# Patient Record
Sex: MALE | Race: WHITE
[De-identification: names, ages, dates, MRNs, and addresses within clinical notes are randomized per-mention and may not be internally consistent; named-entity substitution may affect disease eponyms.]

---

## 2020-11-02 ENCOUNTER — HOSPITAL ENCOUNTER (OUTPATIENT)
Dept: HOSPITAL 41 - JD.SDS | Age: 60
Discharge: HOME | End: 2020-11-02
Attending: ORTHOPAEDIC SURGERY
Payer: COMMERCIAL

## 2020-11-02 VITALS — SYSTOLIC BLOOD PRESSURE: 112 MMHG | HEART RATE: 51 BPM | DIASTOLIC BLOOD PRESSURE: 76 MMHG

## 2020-11-02 DIAGNOSIS — Z79.899: ICD-10-CM

## 2020-11-02 DIAGNOSIS — G89.18: ICD-10-CM

## 2020-11-02 DIAGNOSIS — M17.11: Primary | ICD-10-CM

## 2020-11-02 DIAGNOSIS — Z98.890: ICD-10-CM

## 2020-11-02 DIAGNOSIS — G47.33: ICD-10-CM

## 2020-11-02 PROCEDURE — 97165 OT EVAL LOW COMPLEX 30 MIN: CPT

## 2020-11-02 PROCEDURE — 97161 PT EVAL LOW COMPLEX 20 MIN: CPT

## 2020-11-02 PROCEDURE — 27447 TOTAL KNEE ARTHROPLASTY: CPT

## 2020-11-02 PROCEDURE — 73560 X-RAY EXAM OF KNEE 1 OR 2: CPT

## 2020-11-02 PROCEDURE — 97110 THERAPEUTIC EXERCISES: CPT

## 2020-11-02 PROCEDURE — 97116 GAIT TRAINING THERAPY: CPT

## 2020-11-02 PROCEDURE — C1776 JOINT DEVICE (IMPLANTABLE): HCPCS

## 2020-11-02 PROCEDURE — 87641 MR-STAPH DNA AMP PROBE: CPT

## 2020-11-02 RX ADMIN — DEXTROSE ONE GM: 5 SOLUTION INTRAVENOUS at 08:15

## 2020-11-02 RX ADMIN — DEXTROSE ONE GM: 5 SOLUTION INTRAVENOUS at 08:25

## 2020-11-02 NOTE — PCM.PREANE
Preanesthetic Assessment





- Anesthesia/Transfusion/Family Hx


Anesthesia History: Prior Anesthesia Without Reaction


Family History of Anesthesia Reaction: No


Transfusion History: No Prior Transfusion(s)


Type of Transfusion Reactions: Reports: Unknown





- Review of Systems


General: No Symptoms


Pulmonary: No Symptoms


Cardiovascular: No Symptoms


Gastrointestinal: No Symptoms


Neurological: No Symptoms


Other: Reports: None





- Physical Assessment


NPO Status Date: 11/01/20


NPO Status Time: 18:30


ASA Class: 2


Mental Status: Alert & Oriented x3


Airway Class: Mallampati = 1


Dentition: Reports: Normal Dentition


Thyro-Mental Finger Breadths: 3


Mouth Opening Finger Breadths: 3


ROM/Head Extension: Full


Lungs: Clear to Auscultation, Normal Respiratory Effort


Cardiovascular: Regular Rate, Regular Rhythm





- Lab


Values: 





                             Laboratory Last Values











MRSA (PCR)  Negative   10/21/20  15:05    














- Allergies


Allergies/Adverse Reactions: 


                                    Allergies











Allergy/AdvReac Type Severity Reaction Status Date / Time


 


No Known Allergies Allergy   Verified 11/02/20 06:28














- Acknowledgements


Anesthesia Type Planned: Spinal


Pt an Appropriate Candidate for the Planned Anesthesia: Yes


Alternatives and Risks of Anesthesia Discussed w Pt/Guardian: Yes


Pt/Guardian Understands and Agrees with Anesthesia Plan: Yes





PreAnesthesia Questionnaire





- Past Health History


Medical/Surgical History: Denies Medical/Surgical History


HEENT History: Reports: Impaired Vision


Cardiovascular History: Reports: None


Respiratory History: Reports: Sleep Apnea (CPAP)


Gastrointestinal History: Reports: None


Genitourinary History: Reports: None


OB/GYN History: Reports: None


Musculoskeletal History: Reports: None


Neurological History: Reports: None


Psychiatric History: Reports: None


Endocrine/Metabolic History: Reports: None


Hematologic History: Reports: None


Immunologic History: Reports: None


Oncologic (Cancer) History: Reports: None


Dermatologic History: Reports: None





- Infectious Disease History


Infectious Disease History: Reports: None





- Past Surgical History


Head Surgeries/Procedures: Reports: None


HEENT Surgical History: Reports: None


Cardiovascular Surgical History: Reports: None


Respiratory Surgical History: Reports: None


GI Surgical History: Reports: Colonoscopy


Female  Surgical History: Reports: None


Male  Surgical History: Reports: None


Endocrine Surgical History: Reports: None


Neurological Surgical History: Reports: None


Musculoskeletal Surgical History: Reports: Shoulder Surgery, Other (See Below)


Other Musculoskeletal Surgeries/Procedures:: Right knee surgery in 1981, torn 

meniscus/ACL. Left knee surgery in 1978, torn meniscus. Left hand surgery, hand 

caught in table saw with cut tendons repaired, R SVSA with RCR and SAD


Oncologic Surgical History: Reports: None


Dermatological Surgical History: Reports: None





- SUBSTANCE USE


Tobacco Use Status *Q: Former Tobacco User


Recreational Drug Use History: No





- HOME MEDS


Home Medications: 


                                    Home Meds





Cholecalciferol (Vitamin D3) [Vitamin D3] 1,000 unit PO DAILY 09/09/20 [History]


Cyanocobalamin (Vitamin B12) [Vitamin B12] 1,000 mcg PO DAILY 09/09/20 [History]


Lactobacillus Combo No.10 [Probiotic] 1 cap PO DAILY 09/09/20 [History]


Testosterone Vitamin 1 dose PO DAILY 09/09/20 [History]


Zinc 50 mg PO DAILY 09/09/20 [History]


Fish Oil/Omega-3 Fatty Acids [Fish Oil 1,000 MG] 1 gm PO DAILY 10/30/20 

[History]


Linda 500 mg PO DAILY 10/30/20 [History]


Saw/Vit E/Sod Jossy/Lyc/Beta/Pyg [Prostate Health Caplet] 1 tab PO DAILY 10/30/20 

[History]


Vitamin K2 40 mcg PO DAILY 10/30/20 [History]











- CURRENT (IN HOUSE) MEDS


Current Meds: 





                               Current Medications





Acetaminophen (Tylenol)  975 mg PO NOW FRANSICO


   Stop: 11/02/20 12:00


   Last Admin: 11/02/20 06:27 Dose:  975 mg


   Documented by: 


Lactated Ringer's (Ringers, Lactated)  1,000 mls @ 125 mls/hr IV ASDIRECTED FRANSICO


   Stop: 11/02/20 23:00


   Last Admin: 11/02/20 06:27 Dose:  125 mls/hr


   Documented by: 


Lidocaine/Sodium Bicarbonate (Buffered Lidocaine 1% In Ns 8.4%)  0.25 ml IDERM 

ONETIME PRN


   PRN Reason: Prior to IV Start


   Stop: 11/02/20 23:00


   Last Admin: 11/02/20 06:27 Dose:  0.25 ml


   Documented by: 


Oxycodone HCl (Oxycontin)  10 mg PO ONETIME FRANSICO


   Stop: 11/02/20 12:00


   Last Admin: 11/02/20 06:27 Dose:  10 mg


   Documented by: 


Pregabalin (Lyrica)  50 mg PO ONETIME FRANSICO


   Stop: 11/02/20 12:00


   Last Admin: 11/02/20 06:27 Dose:  50 mg


   Documented by: 


Sodium Chloride (Saline Flush)  10 ml FLUSH ASDIRECTED PRN


   PRN Reason: Keep Vein Open


   Stop: 11/02/20 23:00





Discontinued Medications





Bupivacaine HCl (Sensorcaine-Mpf 0.25%) Confirm Administered Dose 30 ml .ROUTE 

.STK-MED ONE


   Stop: 11/02/20 06:13


Morphine Sulfate 8 mg/Epinephrine HCl 0.3 mg/Cefuroxime Sodium 750 mg/Ketorolac 

Tromethamine 30 mg/Sodium Chloride 7.9 ml  0 mg .XX ASDIRECTED PRN


   PRN Reason: Pain


Tranexamic Acid (Cyklokapron) Confirm Administered Dose 1,000 mg .ROUTE .STK-MED

 ONE


   Stop: 11/02/20 06:13


Vancomycin HCl (Vancomycin) Confirm Administered Dose 1 gm .ROUTE .STK-MED ONE


   Stop: 11/02/20 06:13

## 2020-11-02 NOTE — [id number]
Post Anesthesia Note





- EVALUATION WITHIN 48HRS OF ANESTHETIC


Vital Signs in Normal Range: Yes


Patient Participated in Evaluation: Yes


Respiratory Function Stable: Yes


Airway Patent: Yes


Cardiovascular Function Stable: Yes


Hydration Status Stable: Yes


Pain Control Satisfactory: Yes


Nausea and Vomiting Control Satisfactory: Yes


Mental Status Recovered: Yes


Vital Signs: 


                                Last Vital Signs











Temp  36.8 C   11/02/20 10:50


 


Pulse  70   11/02/20 10:50


 


Resp  12   11/02/20 10:50


 


BP  107/64   11/02/20 10:50


 


Pulse Ox  96   11/02/20 10:50

## 2020-11-02 NOTE — CR
PROCEDURE INFORMATION:

Exam: XR Right Knee

Exam date and time: 11/2/2020 9:06 AM

Age: 60 years old

Clinical indication: Condition or disease; Joint replacement status; Knee; 
Right; Additional info:

Right total knee replacement; Post-op films



TECHNIQUE:

Imaging protocol: XR Right knee.

Views: 1 or 2 views.



COMPARISON:

CR Bone Age Study, Bone Length Scanogram, Knee 3V Rt, Knee Standing 8/19/2020 
1:14 PM



FINDINGS:

Bones/joints: There is a right total knee arthroplasty without evidence of 
hardware complication.

Screws are again present in the patella. Very small residual osteophytes at the 
medial and lateral as

well as posterior articular margins of the tibia and at the lateral femoral 
condyle. No acute fracture.

Normal alignment. Bone mineralization is normal.

Soft tissues: There is postoperative soft tissue air anteriorly. Anterior soft 
tissue swelling. Knee joint

effusion appears to be present.



IMPRESSION:

Right total knee arthroplasty without evidence of hardware complication.



Thank you for allowing us to participate in the care of your patient.



Dictated and Authenticated by: Nellie Miller MD

11/02/2020 10:47 AM Central Time ([place] & [place])

[facility]

## 2020-11-02 NOTE — PCM.SN.2
- Free Text/Narrative


Note: 





Right selective femoral nerve block at the adductor canal for post-procedure 

pain control under US guidance requested by Dr. Manzo.


Time Out: 0907


Start: 0907


End: 0912





Chart reviewed.  Consent signed.  Questions answered. Appropriate monitors 

applied.  Time out performed. Right mid-shaft femur identified with ultrasound, 

scanning medially of femur, the femoral artery in the adductor canal visualized,

and the femoral nerve located laterally to the artery. The skin was prepped 

lateral to the ultrasound probe with chlorahexadine times two.  The 21ga 4 

insulated block needle was inserted under direct ultrasound guidance into the 

adductor canal.  25 mL of 0.5% ropivacaine with 1:200,000 epinephrine was 

injected circumferentially around the nerve with intermittent negative 

aspiration noted.  Patient tolerated the procedure well.  Sterile technique 

noted along with sterile gloves, mask, and sterile probe cover.  See picture on 

progress note and vital signs on nurses notes.  Block completed in PACU.  


Timothy Schmidt CRNA

## 2020-11-02 NOTE — PCM.POSTAN
POST ANESTHESIA ASSESSMENT





- MENTAL STATUS


Mental Status: Alert, Oriented





- VITAL SIGNS


Vital Signs: 


                                Last Vital Signs











Temp  36.5 C   11/02/20 06:05


 


Pulse  62   11/02/20 06:05


 


Resp  16   11/02/20 06:05


 


BP  113/84   11/02/20 06:05


 


Pulse Ox  98   11/02/20 06:05














- RESPIRATORY


Respiratory Status: Respiratory Rate WNL, Airway Patent, O2 Saturation Stable





- CARDIOVASCULAR


CV Status: Pulse Rate WNL, Blood Pressure Stable





- GASTROINTESTINAL


GI Status: No Symptoms





- PAIN


Pain Score: 0





- POST OP HYDRATION


Hydration Status: Adequate & Stable

## 2020-11-12 NOTE — PCM.OPNOTE
- General Post-Op/Procedure Note


Date of Surgery/Procedure: 11/02/20


Operative Procedure(s): right total knee arthroplasty with right knee patellar 

hardware removal


Pre Op Diagnosis: right knee osteoarthrosis with hardware


Post-Op Diagnosis: Same


Anesthesia Technique: Local, MAC, Spinal


Primary Surgeon: Jono Manzo


Anesthesia Provider: Yue Gardner


Assistant: Ebony Kim


Assistant: Mary Ellen Ho


EBL in mLs: 5


Complications: None


Condition: Good


Free Text/Narrative:: 


5


5


9mm


33x9 patella cemented

## 2020-11-16 NOTE — OR
DATE OF OPERATION:  11/02/2020

 

SURGEON:  Jono Manzo MD

 

OPERATION PERFORMED:  Right total knee arthroplasty with right knee patellar

hardware removal.

 

PREOPERATIVE DIAGNOSIS:

Right knee osteoarthrosis with hardware.

 

POSTOPERATIVE DIAGNOSIS:

Right knee osteoarthrosis with hardware.

 

ANESTHESIA:

Local MAC with spinal.

 

ANESTHESIA PROVIDER:

Yue Gardner CRNA.

 

ASSISTANTS:

Ebony Kim PA-C, and Mary Ellen Ho LPN.

 

ESTIMATED BLOOD LOSS:

5 mL.

 

COMPLICATIONS:

None.

 

CONDITION:

Stable.

 

IMPLANTS:

1. [place] size 5 press-fit CR femur.

2. [place] size 5 press-fit tibial baseplate.

3. [place] size 5 9 mm CS polyethylene insert.

4. [place] size 33 x 9 mm cemented asymmetric patella.

 

DESCRIPTION OF PROCEDURE:

The patient was identified in the preoperative holding area.  Proper site was

marked and identified by the surgeon.  The patient was taken back to the

operative theater, where after adequate anesthesia, nonsterile tourniquet was

applied.  The right lower extremity was then sterilely prepped and draped in

usual sterile fashion.  OR time-out was performed.  The patient received 2 g IV

Ancef.  Right lower extremity was exsanguinated.  Tourniquet was insufflated to

250 mmHg.  The previous incision was utilized over the patella.  This was taken

down and a medial parapatellar arthrotomy was created.  Deep fibers of the MCL

were raised.  Any scar tissue was removed around the patella.  At this time,

attention was turned to the patella, and the patella measured a 25 and was

resected to a 15 for 33 x 9 mm patella.  At this time, I did run into the washer

from the previous screw and we did remove that hardware, the other 2 screws, 1

did come out, the other 1 was visible, but was retained as it was in good bone

yet and not loose.  I was able to position the buttons for the patella around

the remaining screw and drilled drill holes for the patella with good bone stock

for cementing.  Attention was turned to the distal femur.  Intramedullary drill

hole was placed in the distal femur.  The intramedullary cutting guide for the

distal femur was placed.  An 8 mm was resected off the distal femur.  The sizing

guide was placed and epicondylar access holes were drilled using [place] line

and epicondyles as reference.  4 in 1 cutting block for a size 5 was then placed

anterior to posterior and anterior and posterior chamfer cuts were then

completed and found to be adequate.  Attention was turned to the tibia.

Anterior, posterior, and then mediolateral retractors were placed.  The

extramedullary tibial cutting guide was then placed in the old footprint of the

ACL and was aligned with the center point of the ankle.  A 9 mm was then

resected off the good lateral side of the knee and it was found to have an

adequate resection.  Medial and lateral meniscus were then both removed as well

as any posterior bony osteophytes.  A size 5 baseplate was found to have

adequate coverage.  Trial implants were then placed, 9 mm spacer and the patella

was tracking centrally with no signs of patellar subluxation.  Cement was mixed

on the back table.  The patella was then irrigated and then completely dried.

The size 5 press-fit tibial baseplate was impacted into place after it was

properly stamped and drilled.  The size 5 femur was impacted into place and a 9

mm CS polyethylene insert was impacted into place.  The patient's knee was

brought to full extension and then the patellar button was cemented in place.

It was held while the cement cured.  1 L of pulse lavage irrigation was

irrigated through the knee along with IrriSept irrigation.  Once this was

completed, periarticular injection was completed.  Topical tranexamic acid and

vancomycin powder were applied.  A #2 barbed suture was used for closure of the

medial parapatellar arthrotomy.  2-0 Vicryl was used subcutaneously, Prineo was

used for the skin.  The patient had a sterile soft dressing applied and was sent

to the PACU in stable condition.

 

DD:  11/16/2020 07:26:02

DT:  11/16/2020 07:47:22  PREM

Job #:  [id number]